# Patient Record
Sex: FEMALE | Race: WHITE | Employment: UNEMPLOYED | URBAN - METROPOLITAN AREA
[De-identification: names, ages, dates, MRNs, and addresses within clinical notes are randomized per-mention and may not be internally consistent; named-entity substitution may affect disease eponyms.]

---
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
## 2022-05-17 ENCOUNTER — OFFICE VISIT (OUTPATIENT)
Dept: ENDOCRINOLOGY | Age: 22
End: 2022-05-17
Payer: MEDICAID

## 2022-05-17 VITALS
RESPIRATION RATE: 20 BRPM | SYSTOLIC BLOOD PRESSURE: 92 MMHG | OXYGEN SATURATION: 95 % | HEIGHT: 66 IN | DIASTOLIC BLOOD PRESSURE: 60 MMHG | BODY MASS INDEX: 20.46 KG/M2 | HEART RATE: 101 BPM | WEIGHT: 127.3 LBS

## 2022-05-17 DIAGNOSIS — E22.1 HYPERPROLACTINEMIA (HCC): Primary | ICD-10-CM

## 2022-05-17 PROCEDURE — 99204 OFFICE O/P NEW MOD 45 MIN: CPT | Performed by: INTERNAL MEDICINE

## 2022-05-17 RX ORDER — MEDROXYPROGESTERONE ACETATE 10 MG/1
TABLET ORAL
COMMUNITY
End: 2022-05-17 | Stop reason: ALTCHOICE

## 2022-05-17 RX ORDER — CITALOPRAM 20 MG/1
20 TABLET, FILM COATED ORAL DAILY
COMMUNITY
Start: 2022-05-02 | End: 2022-05-17 | Stop reason: ALTCHOICE

## 2022-05-17 RX ORDER — CITALOPRAM 10 MG/1
20 TABLET ORAL DAILY
COMMUNITY
Start: 2021-12-09 | End: 2022-05-17 | Stop reason: ALTCHOICE

## 2022-05-17 RX ORDER — MEDROXYPROGESTERONE ACETATE 150 MG/ML
1 INJECTION, SUSPENSION INTRAMUSCULAR
COMMUNITY

## 2022-05-17 NOTE — PROGRESS NOTES
Chief Complaint   Patient presents with    New Patient    Abnormal Lab Results     Abnormal prolactin levels      History of Present Illness: Robb Munroe is a 25 y.o. female with a past medical hx significant for ADHD, autism spectrum d/o and anxiety seen in referral from [facility] for discussion related to an elevated prolactin level. Sesar Jackson reports that a prolactin level was checked because she was experiencing galactorrhea. She notes that her menses have lightened since she received the depo provera injection. Endorses cold intolerance requiring multiple blankets overnight to stay warm, is concerned about developing diabetes as multiple friends do have this. Pediatric psychiatrist Solange Salazar MD recently recommended she switch her over to adult psychiatry. Past Medical History:   Diagnosis Date    Anxiety      Past Surgical History:   Procedure Laterality Date    HX WISDOM TEETH EXTRACTION       Current Outpatient Medications   Medication Sig    medroxyPROGESTERone (Depo-Provera) 150 mg/mL syrg 1 mL.  Lisdexamfetamine (VYVANSE) 50 mg cap Take  by mouth daily.  dextroamphetamine-amphetamine (ADDERALL) 10 mg tablet Take 10 mg by mouth daily.  risperiDONE (RISPERDAL) 0.5 mg tablet Take  by mouth. No current facility-administered medications for this visit. No Known Allergies  History reviewed. No pertinent family history.     Social Hx: Lives with mother Lilly Hernandez    Review of Systems:  - See HPI    - Physical Examination:  Visit Vitals  BP 92/60   Pulse (!) 101   Resp 20   Ht 5' 6\" (1.676 m)   Wt 127 lb 4.8 oz (57.7 kg)   SpO2 95%   BMI 20.55 kg/m²     -   - - GENERAL: NCAT, Appears well nourished   - EYES: EOMI, non-icteric, no proptosis   - Ear/Nose/Throat: NCAT, no visible inflammation or masses   - CARDIOVASCULAR: no cyanosis, no visible JVD   - RESPIRATORY: respiratory effort normal without any distress or labored breathing   - MUSCULOSKELETAL: Normal ROM of neck and upper extremities observed   - SKIN: No rash on face  - NEUROLOGIC:  No facial asymmetry (Cranial nerve 7 motor function), No gaze palsy   - PSYCHIATRIC: Normal affect, Normal insight and judgement     Data Reviewed:       Assessment/Plan: This is a very pleasant 67-year-old female seen in referral from [facility] for discussion related to an elevated prolactin level. The prolactin level was mild to moderately elevated and most likely related to the use of risperidone which is a potent dopamine antagonist.  By inhibiting the tonic inhibition of dopamine, prolactin levels are increased which lead to galactorrhea and amenorrhea. The most appropriate neck step at this time would be to consider alternative antipsychotics that do not increase prolactin levels such as quetiapine. Alternatively, Abilify could be added to the existing regimen. Primary concern here is reductions in bone mineral density related to prolactin induced inhibition of the hypothalamic pituitary gonadal axis.     #Drug-induced hyperprolactinemia  -Encouraged patient and her mother to discuss alternative medications aside from risperidone as outlined above  -MRI pituitary is not appropriate at this time given use of dopamine antagonist  -Repeat prolactin level in 6 months  -Is currently receiving Depo injections    Copy sent Moiz Otto MD    Return to care in 6 months    Rollie Skiff, MD  [address] [place] [address] Endocrinology

## 2022-11-17 DIAGNOSIS — E22.1 HYPERPROLACTINEMIA (HCC): ICD-10-CM

## 2023-02-01 ENCOUNTER — OFFICE VISIT (OUTPATIENT)
Dept: ENDOCRINOLOGY | Age: 23
End: 2023-02-01
Payer: MEDICAID

## 2023-02-01 VITALS
RESPIRATION RATE: 16 BRPM | BODY MASS INDEX: 23.3 KG/M2 | WEIGHT: 145 LBS | SYSTOLIC BLOOD PRESSURE: 111 MMHG | HEART RATE: 109 BPM | OXYGEN SATURATION: 95 % | DIASTOLIC BLOOD PRESSURE: 74 MMHG | HEIGHT: 66 IN

## 2023-02-01 DIAGNOSIS — E22.1 HYPERPROLACTINEMIA (HCC): Primary | ICD-10-CM

## 2023-02-01 DIAGNOSIS — R63.5 WEIGHT GAIN: ICD-10-CM

## 2023-02-01 PROCEDURE — 99214 OFFICE O/P EST MOD 30 MIN: CPT | Performed by: INTERNAL MEDICINE

## 2023-02-01 RX ORDER — ARIPIPRAZOLE 10 MG/1
10 TABLET ORAL DAILY
COMMUNITY
Start: 2022-12-27 | End: 2023-03-27

## 2023-02-01 RX ORDER — PHENAZOPYRIDINE HYDROCHLORIDE 200 MG/1
TABLET, FILM COATED ORAL
COMMUNITY
Start: 2023-01-02

## 2023-02-01 NOTE — PROGRESS NOTES
Chief Complaint   Patient presents with    Abnormal Lab Results     Prolactin levels        History of Present Illness: Edinson Carr is a 25 y.o. female with a past medical hx significant for ADHD, autism spectrum d/o and anxiety seen in referral from [facility] [place] for discussion related to an elevated prolactin level. Initial visit:  Lenny Ayala reports that a prolactin level was checked because she was experiencing galactorrhea. She notes that her menses have lightened since she received the depo provera injection. Endorses cold intolerance requiring multiple blankets overnight to stay warm, is concerned about developing diabetes as multiple friends do have this. Pediatric psychiatrist Maged Sevilla MD recently recommended she switch her over to adult psychiatry. 02/01/2023:Was switched to ability- gaining weight. No more galactorrhea. Stays up all night, does not cook for herself, wakes up at 4PM. Previously saw nutritionist when she was 9years old. Current Outpatient Medications   Medication Sig    phenazopyridine (PYRIDIUM) 200 mg tablet TAKE 1 TABLET BY MOUTH THREE TIMES DAILY AFTER A MEAL FOR URINARY SYMPTOMS. MAY DISCOLOR URINE    ARIPiprazole (ABILIFY) 10 mg tablet Take 10 mg by mouth daily. medroxyPROGESTERone (DEPO-PROVERA) 150 mg/mL syrg 1 mL. Lisdexamfetamine (VYVANSE) 50 mg cap Take  by mouth daily. dextroamphetamine-amphetamine (ADDERALL) 10 mg tablet Take 10 mg by mouth daily. risperiDONE (RISPERDAL) 0.5 mg tablet Take  by mouth. (Patient not taking: Reported on 2/1/2023)     No current facility-administered medications for this visit. No Known Allergies  No family history on file.     Social Hx: Lives with mother Tommy Patiño    Review of Systems:  See HPI    Physical Examination:  Visit Vitals  /74   Pulse (!) 109   Resp 16   Ht 5' 6\" (1.676 m)   Wt 145 lb (65.8 kg)   SpO2 95%   BMI 23.40 kg/m²         - GENERAL: NCAT, Appears well nourished   - EYES: EOMI, non-icteric, no proptosis   - Ear/Nose/Throat: NCAT, no visible inflammation or masses   - CARDIOVASCULAR: no cyanosis, no visible JVD   - RESPIRATORY: respiratory effort normal without any distress or labored breathing   - MUSCULOSKELETAL: Normal ROM of neck and upper extremities observed   - SKIN: No rash on face  - NEUROLOGIC:  No facial asymmetry (Cranial nerve 7 motor function), No gaze palsy   - PSYCHIATRIC: Normal affect, Normal insight and judgement     Data Reviewed:       Assessment/Plan: This is a very pleasant 23-year-old female seen in referral from [facility] for discussion related to an elevated prolactin level. The prolactin level was mild to moderately elevated and most likely related to the use of risperidone which is a potent dopamine antagonist.  By inhibiting the tonic inhibition of dopamine, prolactin levels are increased which lead to galactorrhea and amenorrhea. Was switched as of 02/01 to ability- reassess level now. Otherwise, referral placed to nutrition due to weight gain.       #Drug-induced hyperprolactinemia  -switched to abilify as of Feb 2023- reassess  -MRI pituitary is not appropriate at this time given use of dopamine antagonist  -Is currently receiving Depo injections    #weight gain  -referral to nutrition    Copy sent Jo Pina MD    Return to care pending prolactin    Alayna Dorantes MD  [facility]

## 2023-02-23 NOTE — LETTER
2/23/2023    Ms[address] [facility] [address]      Dear Steffanie Eubanks:    Please find your most recent results below. Resulted Orders   PROLACTIN   Result Value Ref Range    Prolactin 6.0 4.8 - 23.3 ng/mL    Narrative    Performed at:  [facility]  [address]  [id number]  : Pat Harrison MD, Phone:  [phone]       RECOMMENDATIONS:    [address]-    I'm happy to report that your prolactin level is now normal since risperidone was discontinued. Please let me know if you need anything in the future; we do not need to schedule a follow up visit unless the prolactin level raises again.      Please call me if you have any questions: [phone]    Sincerely,      Anthony Gerard MD

## 2024-11-21 ENCOUNTER — ANESTHESIA EVENT (OUTPATIENT)
Facility: HOSPITAL | Age: 24
End: 2024-11-21
Payer: MEDICAID

## 2024-11-21 ENCOUNTER — HOSPITAL ENCOUNTER (OUTPATIENT)
Facility: HOSPITAL | Age: 24
Setting detail: OUTPATIENT SURGERY
Discharge: HOME | End: 2024-11-21
Attending: SPECIALIST | Admitting: SPECIALIST
Payer: MEDICAID

## 2024-11-21 ENCOUNTER — ANESTHESIA (OUTPATIENT)
Facility: HOSPITAL | Age: 24
End: 2024-11-21
Payer: MEDICAID

## 2024-11-21 VITALS
HEIGHT: 66 IN | WEIGHT: 157 LBS | HEART RATE: 94 BPM | OXYGEN SATURATION: 99 % | BODY MASS INDEX: 25.23 KG/M2 | SYSTOLIC BLOOD PRESSURE: 112 MMHG | DIASTOLIC BLOOD PRESSURE: 87 MMHG | RESPIRATION RATE: 15 BRPM | TEMPERATURE: 98 F

## 2024-11-21 PROCEDURE — 2720000010 HC SURG SUPPLY STERILE: Performed by: SPECIALIST

## 2024-11-21 PROCEDURE — 7100000011 HC PHASE II RECOVERY - ADDTL 15 MIN: Performed by: SPECIALIST

## 2024-11-21 PROCEDURE — 6360000002 HC RX W HCPCS: Performed by: NURSE ANESTHETIST, CERTIFIED REGISTERED

## 2024-11-21 PROCEDURE — 2709999900 HC NON-CHARGEABLE SUPPLY: Performed by: SPECIALIST

## 2024-11-21 PROCEDURE — 3700000000 HC ANESTHESIA ATTENDED CARE: Performed by: SPECIALIST

## 2024-11-21 PROCEDURE — 3600007512: Performed by: SPECIALIST

## 2024-11-21 PROCEDURE — 3600007502: Performed by: SPECIALIST

## 2024-11-21 PROCEDURE — 3700000001 HC ADD 15 MINUTES (ANESTHESIA): Performed by: SPECIALIST

## 2024-11-21 PROCEDURE — 88305 TISSUE EXAM BY PATHOLOGIST: CPT

## 2024-11-21 PROCEDURE — C1769 GUIDE WIRE: HCPCS | Performed by: SPECIALIST

## 2024-11-21 PROCEDURE — 7100000010 HC PHASE II RECOVERY - FIRST 15 MIN: Performed by: SPECIALIST

## 2024-11-21 RX ORDER — SODIUM CHLORIDE 9 MG/ML
INJECTION, SOLUTION INTRAVENOUS PRN
Status: DISCONTINUED | OUTPATIENT
Start: 2024-11-21 | End: 2024-11-21 | Stop reason: HOSPADM

## 2024-11-21 RX ORDER — IBUPROFEN 200 MG
400 TABLET ORAL EVERY 6 HOURS PRN
COMMUNITY

## 2024-11-21 RX ORDER — SODIUM CHLORIDE 9 MG/ML
INJECTION, SOLUTION INTRAVENOUS CONTINUOUS
Status: DISCONTINUED | OUTPATIENT
Start: 2024-11-21 | End: 2024-11-21 | Stop reason: HOSPADM

## 2024-11-21 RX ORDER — LIDOCAINE HYDROCHLORIDE 20 MG/ML
INJECTION, SOLUTION EPIDURAL; INFILTRATION; INTRACAUDAL; PERINEURAL
Status: DISCONTINUED | OUTPATIENT
Start: 2024-11-21 | End: 2024-11-21 | Stop reason: SDUPTHER

## 2024-11-21 RX ORDER — PANTOPRAZOLE SODIUM 40 MG/1
40 TABLET, DELAYED RELEASE ORAL DAILY
COMMUNITY

## 2024-11-21 RX ORDER — SODIUM CHLORIDE 0.9 % (FLUSH) 0.9 %
5-40 SYRINGE (ML) INJECTION PRN
Status: DISCONTINUED | OUTPATIENT
Start: 2024-11-21 | End: 2024-11-21 | Stop reason: HOSPADM

## 2024-11-21 RX ORDER — SODIUM CHLORIDE 0.9 % (FLUSH) 0.9 %
5-40 SYRINGE (ML) INJECTION EVERY 12 HOURS SCHEDULED
Status: DISCONTINUED | OUTPATIENT
Start: 2024-11-21 | End: 2024-11-21 | Stop reason: HOSPADM

## 2024-11-21 RX ADMIN — PROPOFOL 50 MG: 10 INJECTION, EMULSION INTRAVENOUS at 16:09

## 2024-11-21 RX ADMIN — PROPOFOL 50 MG: 10 INJECTION, EMULSION INTRAVENOUS at 16:06

## 2024-11-21 RX ADMIN — PROPOFOL 50 MG: 10 INJECTION, EMULSION INTRAVENOUS at 16:04

## 2024-11-21 RX ADMIN — PROPOFOL 200 MG: 10 INJECTION, EMULSION INTRAVENOUS at 16:01

## 2024-11-21 RX ADMIN — LIDOCAINE HYDROCHLORIDE 100 MG: 20 INJECTION, SOLUTION EPIDURAL; INFILTRATION; INTRACAUDAL; PERINEURAL at 16:01

## 2024-11-21 ASSESSMENT — PAIN - FUNCTIONAL ASSESSMENT: PAIN_FUNCTIONAL_ASSESSMENT: NONE - DENIES PAIN

## 2024-11-21 NOTE — OP NOTE
[place] [address]                 NAME:  Amee Zapata   :   2000   MRN:   [id number]     Date/Time:  2024 4:13 PM    Esophagogastroduodenoscopy (EGD) Procedure Note    :  Jon Lora MD    Staff: Circulator: Lionel Reich RN  Circulator Assist: Mallika Daniel RN     Referring Provider:  Luis Solomon MD    Anethesia/Sedation:  MAC anesthesia Propofol    Preoperative diagnosis: Gastroesophageal reflux disease, unspecified whether esophagitis present [K21.9], dysphagia      Procedure Details     [facility], with all risks and benefits of procedure explained the patient was taken to the endoscopy suite and placed in the left lateral decubitus position.  Following sequential administration of sedation as per above, the [id number] gastroscope was inserted into the mouth and advanced under direct vision to second portion of the duodenum.  A careful inspection was made as the gastroscope was withdrawn, including a retroflexed view of the proximal stomach; findings and interventions are described below.      Findings:  Esophagus: Proximal and midesophagus appeared to be dilated with poor motility.  Distal esophagus appeared to be J-shaped and diaphragm appeared to be at the level of antrum indicating a large hiatal hernia.  Random biopsies done and esophagus was dilated with a 51 Tajik wire-guided Savary dilator.  Stomach: Multiple erosions in the antrum, biopsies done.  Duodenum/jejunum: Multiple superficial, nonbleeding ulcers measuring 4 to 10 mm in the duodenal bulb.      Therapies: As above.    Specimens:  ID Type Source Tests Collected by Time Destination   1 : Gastric Antrum Biopsy Tissue Gastric SURGICAL PATHOLOGY Jon Lora MD 2024 [id number]    2 : Random Esophagus Biopsy Tissue Esophagus SURGICAL PATHOLOGY Jon Lora MD 2024 1607               EBL:

## 2024-11-21 NOTE — DISCHARGE INSTRUCTIONS
Ameetrinity Zapata  [id number]  2000    EGD DISCHARGE INSTRUCTIONS  Discomfort:  Sore throat- throat lozenges or warm salt water gargle  redness at IV site- apply warm compress to area; if redness or soreness persist- contact your physician  Gaseous discomfort- walking, belching will help relieve any discomfort    DIET  You may resume your regular diet - however -  remember your colon is empty and a heavy meal will produce gas.   Avoid these foods:  vegetables, fried / greasy foods, carbonated drinks  You may not drink alcoholic beverages for at least 12 hours    MEDICATIONS   Regarding Aspirin or Nonsteroidal medications specifically, please see below.    ACTIVITY  You may resume your normal daily activities.   Spend the remainder of the day resting -  avoid any strenuous activity.  You may not operate a vehicle for 12 hours  You may not engage in an occupation involving machinery or appliances for rest of today.  Avoid making any critical decisions for at least 24 hour    CALL M.D.  ANY SIGN OF   Increasing pain, nausea, vomiting  Abdominal distension (swelling)  New increased bleeding (oral or rectal)  Fever (chills)  Pain in chest area  Bloody discharge from nose or mouth  Shortness of breath    You may not  take any Advil, Aspirin, Ibuprofen, Motrin, Aleve, or Goody’s for 10 days, ONLY  Tylenol as needed for pain.    Post procedure diagnosis: Duodenal ulcers & gastritis (biopsies taken), hiatal hernia  Post-procedure recommendations: Take Omeprazole twice daily as prescribed    Follow-up Instructions:   Call Dr. Lora  Results of procedure / biopsy in 10 days  Telephone #  [phone]

## 2024-11-21 NOTE — ANESTHESIA PRE PROCEDURE
Department of Anesthesiology  Preprocedure Note       Name:  Amee Zapata   Age:  24 y.o.  :  2000                                          MRN:  [id number]         Date:  2024      Surgeon: Surgeon(s):  Jon Lora MD    Procedure: Procedure(s):  ESOPHAGOGASTRODUODENOSCOPY    Medications prior to admission:   Prior to Admission medications    Medication Sig Start Date End Date Taking? Authorizing Provider   pantoprazole (PROTONIX) 40 MG tablet Take 1 tablet by mouth daily   Yes ProviderLeydi MD   ibuprofen (ADVIL;MOTRIN) 200 MG tablet Take 2 tablets by mouth every 6 hours as needed for Pain   Yes Provider, MD Leydi   medroxyPROGESTERone (DEPO-PROVERA) 150 MG/ML injection 150 mg   Yes Automatic Reconciliation, Ar   amphetamine-dextroamphetamine (ADDERALL) 10 MG tablet Take 10 mg by mouth daily.  Patient not taking: Reported on 2024    Automatic Reconciliation, Ar       Current medications:    Current Facility-Administered Medications   Medication Dose Route Frequency Provider Last Rate Last Admin   • 0.9 % sodium chloride infusion   IntraVENous Continuous Jon Lora MD       • sodium chloride flush 0.9 % injection 5-40 mL  5-40 mL IntraVENous 2 times per day Jon Lora MD       • sodium chloride flush 0.9 % injection 5-40 mL  5-40 mL IntraVENous PRN Jon Lora MD       • 0.9 % sodium chloride infusion   IntraVENous PRN Jon Lora MD           Allergies:  No Known Allergies    Problem List:    Patient Active Problem List   Diagnosis Code   • Anxiety F41.9       Past Medical History:        Diagnosis Date   • Anxiety        Past Surgical History:        Procedure Laterality Date   • WISDOM TOOTH EXTRACTION         Social History:    Social History     Tobacco Use   • Smoking status: Never   • Smokeless tobacco: Not on file   Substance Use Topics   • Alcohol use: Not on file                                Counseling given: Not Answered      Vital Signs (Current):

## 2024-11-21 NOTE — H&P
Pre-endoscopy H and P     The patient was seen and examined in the endoscopy suite. The airway was assessed and docuemented. The problem list and medications were reviewed.     Patient Active Problem List   Diagnosis    Anxiety     Social History     Socioeconomic History    Marital status: Single     Spouse name: Not on file    Number of children: Not on file    Years of education: Not on file    Highest education level: Not on file   Occupational History    Not on file   Tobacco Use    Smoking status: Never    Smokeless tobacco: Not on file   Substance and Sexual Activity    Alcohol use: Never    Drug use: Not on file    Sexual activity: Not on file   Other Topics Concern    Not on file   Social History Narrative    Not on file     Social Determinants of Health     Financial Resource Strain: Not on file   Food Insecurity: Not on file   Transportation Needs: Not on file   Physical Activity: Not on file   Stress: Not on file   Social Connections: Not on file   Intimate Partner Violence: Not on file   Housing Stability: Not on file     Past Medical History:   Diagnosis Date    Anxiety          Prior to Admission Medications   Prescriptions Last Dose Informant Patient Reported? Taking?   ARIPiprazole (ABILIFY) 10 MG tablet   Yes No   Sig: Take 10 mg by mouth daily   amphetamine-dextroamphetamine (ADDERALL) 10 MG tablet Not Taking  Yes No   Sig: Take 10 mg by mouth daily.   Patient not taking: Reported on 2024   ibuprofen (ADVIL;MOTRIN) 200 MG tablet 2024  Yes Yes   Sig: Take 2 tablets by mouth every 6 hours as needed for Pain   lisdexamfetamine (VYVANSE) 50 MG capsule   Yes No   Sig: Take by mouth daily.   medroxyPROGESTERone (DEPO-PROVERA) 150 MG/ML injection 2024  Yes Yes   Si mg   pantoprazole (PROTONIX) 40 MG tablet 2024  Yes Yes   Sig: Take 1 tablet by mouth daily   phenazopyridine (PYRIDIUM) 200 MG tablet   Yes No   Sig: TAKE 1 TABLET BY MOUTH THREE TIMES DAILY AFTER A MEAL FOR

## 2024-11-22 NOTE — ANESTHESIA POSTPROCEDURE EVALUATION
Department of Anesthesiology  Postprocedure Note    Patient: Amee Zapata  MRN: [id number]  YOB: 2000  Date of evaluation: 11/22/2024    Procedure Summary       Date: 11/21/24 Room / Location: [place] [address] / [facility] ENDOSCOPY    Anesthesia Start: 1554 Anesthesia Stop: 1612    Procedure: ESOPHAGOGASTRODUODENOSCOPY (Upper GI Region) Diagnosis:       Gastroesophageal reflux disease, unspecified whether esophagitis present      (Gastroesophageal reflux disease, unspecified whether esophagitis present [K21.9])    Surgeons: Jon Lora MD Responsible Provider: Anne Neumann DO    Anesthesia Type: MAC ASA Status: 2            Anesthesia Type: MAC    Cathy Phase I: Cathy Score: 10    Cathy Phase II: Cathy Score: 9    Anesthesia Post Evaluation    Patient location during evaluation: PACU  Patient participation: complete - patient participated  Level of consciousness: awake and alert  Pain score: 0  Airway patency: patent  Nausea & Vomiting: no nausea and no vomiting  Cardiovascular status: blood pressure returned to baseline and hemodynamically stable  Respiratory status: acceptable and room air  Hydration status: euvolemic  Multimodal analgesia pain management approach  Pain management: adequate and satisfactory to patient    No notable events documented.

## (undated) DEVICE — CLEANGUIDE DISPOSABLE MARKED SPRING TIP GUIDEWIRE, 1.86 MM X 210 CM: Brand: CLEANGUIDE

## (undated) DEVICE — Device

## (undated) DEVICE — FORCEPS BX L240CM JAW DIA2.4MM ORNG L CAP W/ NDL DISP RAD